# Patient Record
Sex: MALE | Race: OTHER | NOT HISPANIC OR LATINO | URBAN - METROPOLITAN AREA
[De-identification: names, ages, dates, MRNs, and addresses within clinical notes are randomized per-mention and may not be internally consistent; named-entity substitution may affect disease eponyms.]

---

## 2024-04-11 ENCOUNTER — EMERGENCY (EMERGENCY)
Facility: HOSPITAL | Age: 19
LOS: 1 days | Discharge: HOME | End: 2024-04-11
Admitting: STUDENT IN AN ORGANIZED HEALTH CARE EDUCATION/TRAINING PROGRAM
Payer: MEDICAID

## 2024-04-11 VITALS
OXYGEN SATURATION: 98 % | HEART RATE: 96 BPM | TEMPERATURE: 98 F | SYSTOLIC BLOOD PRESSURE: 117 MMHG | DIASTOLIC BLOOD PRESSURE: 73 MMHG | WEIGHT: 199.96 LBS | RESPIRATION RATE: 16 BRPM

## 2024-04-11 PROCEDURE — 0225U NFCT DS DNA&RNA 21 SARSCOV2: CPT

## 2024-04-11 PROCEDURE — 99283 EMERGENCY DEPT VISIT LOW MDM: CPT

## 2024-04-11 PROCEDURE — 99284 EMERGENCY DEPT VISIT MOD MDM: CPT

## 2024-04-11 RX ORDER — IBUPROFEN 200 MG
600 TABLET ORAL ONCE
Refills: 0 | Status: COMPLETED | OUTPATIENT
Start: 2024-04-11 | End: 2024-04-11

## 2024-04-11 RX ADMIN — Medication 600 MILLIGRAM(S): at 15:42

## 2024-04-11 NOTE — ED ADULT NURSE NOTE - CINV DISCH TEACH PARTICIP
Notification Instructions: Patient will be notified of biopsy results if positive however, patient is welcome to call office if they would like to know their biopsy results. Patient

## 2024-04-11 NOTE — ED PROVIDER NOTE - CLINICAL SUMMARY MEDICAL DECISION MAKING FREE TEXT BOX
Oral pain likely due to recently applied braces, and from oral manipulation during the application.  Has braces, mildly enlarged cryptic tonsils; otherwise the oral exam is unremarkable.  Has no throat pain or dysphagia, so do not suspect strep throat.  Eating a sandwich in ED. Consider respiratory viruses that could cause gingival pain, will do RVP.  Recommend continue ibuprofen for pain, and adding Anbesol or Oragel.

## 2024-04-11 NOTE — ED ADULT NURSE NOTE - CAS EDN DISCHARGE ASSESSMENT
PAST SURGICAL HISTORY:  No significant past surgical history
Alert and oriented to person, place and time

## 2024-04-11 NOTE — ED PROVIDER NOTE - OBJECTIVE STATEMENT
19 yo male without significant pmh c/o "chancre sores" to lower gums; reports pain on the lingual aspect of the lower molar gingiva that began 2 days ago.  He just had lower braces applied to his teeth apx 3 days before the pain began. States devices used to hold mouth open during the application of the braces was painful.  States it is painful to chew on either side, but no swelling of tongue, difficulty breathing, fever/chills, stiff neck, cough, rhinorrhea, sore throat, chest pain, or other acute complaints.   He took a dose of ibuprofen 600 mg two days ago with little relief, has not used any other analgesics.

## 2024-04-11 NOTE — ED PROVIDER NOTE - NSFOLLOWUPINSTRUCTIONS_ED_ALL_ED_FT
Soft/pureed foods - soups, smoothies, ice cream, etc.  Ibuprofen 600 mg every 6-8 hours (set a timer on your phone to remind you).   Anbesol or Oragel (you can get the generic formulations) at a pharmacy.    We are running a viral swab to test for viruses that could cause discomfort in your mouth.      Please follow up with your dentist/orthodontist in 1-3 days.    Return to the Emergency Department if you have any new or worsening symptoms, or if you have any concerns.

## 2024-04-11 NOTE — ED PROVIDER NOTE - PATIENT PORTAL LINK FT
You can access the FollowMyHealth Patient Portal offered by [facility] by registering at the following website: [email or web address]. By joining [facility]’s FollowMyHealth portal, you will also be able to view your health information using other applications (apps) compatible with our system.

## 2024-04-11 NOTE — ED PROVIDER NOTE - PHYSICAL EXAMINATION
CONSTITUTIONAL: NAD   SKIN: Normal color and turgor.    HEAD: NC/AT.  EYES: Conjunctiva clear. Anicteric sclera.  ENT: Airway clear. Normal voice. No swelling of lips, no intraoral swelling.  Bilateral large, cryptic tonsils without exudate or erythema.  Uvula midline.  Braces on upper and lower teeth.  No erythema or swelling of gingiva, no gingival lesions noted.  Mild bilateral submandibular gland swelling. No cervical CIRA.    RESPIRATORY:  Normal work of breathing. Lungs CTAB.  CARDIOVASCULAR:  RRR, S1S2. No M/R/G.      GI:  Abdomen soft, nontender.    MSK: Neck supple.  No LE edema or calf tenderness. No joint swelling or ROM limitation.  NEURO: Alert; CN: grossly intact. Speech clear.  BEAL. Gait steady.

## 2024-04-11 NOTE — ED ADULT NURSE NOTE - OBJECTIVE STATEMENT
Pt is a 19yo male presenting to ED c/o mouth sores. Pt states, "I have 2 canker sores on both sides of my lower gums. It is making it hard to eat, and the pain is radiating to my lower jaw. I have taken ibuprofen for the pain with no relief. Last time this happened I used a numbing cream that worked but this time it has not worked at all." Pt is A&Ox4, no further concerns at this time.

## 2024-04-15 DIAGNOSIS — Z20.822 CONTACT WITH AND (SUSPECTED) EXPOSURE TO COVID-19: ICD-10-CM

## 2024-04-15 DIAGNOSIS — Z98.818 OTHER DENTAL PROCEDURE STATUS: ICD-10-CM

## 2024-04-15 DIAGNOSIS — K13.79 OTHER LESIONS OF ORAL MUCOSA: ICD-10-CM

## 2024-04-15 DIAGNOSIS — J35.1 HYPERTROPHY OF TONSILS: ICD-10-CM

## 2024-06-27 ENCOUNTER — EMERGENCY (EMERGENCY)
Facility: HOSPITAL | Age: 19
LOS: 1 days | Discharge: HOME | End: 2024-06-27
Admitting: EMERGENCY MEDICINE
Payer: MEDICAID

## 2024-06-27 VITALS
DIASTOLIC BLOOD PRESSURE: 80 MMHG | SYSTOLIC BLOOD PRESSURE: 129 MMHG | WEIGHT: 214.95 LBS | OXYGEN SATURATION: 99 % | RESPIRATION RATE: 18 BRPM | HEART RATE: 82 BPM | TEMPERATURE: 98 F
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
## 2024-06-27 DIAGNOSIS — M79.671 PAIN IN RIGHT FOOT: ICD-10-CM
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
## 2024-06-27 PROCEDURE — 73630 X-RAY EXAM OF FOOT: CPT | Mod: 26,RT

## 2024-06-27 PROCEDURE — 73630 X-RAY EXAM OF FOOT: CPT

## 2024-06-27 PROCEDURE — 99284 EMERGENCY DEPT VISIT MOD MDM: CPT

## 2024-06-27 PROCEDURE — 99283 EMERGENCY DEPT VISIT LOW MDM: CPT | Mod: 25

## 2024-06-27 RX ORDER — IBUPROFEN 200 MG
600 TABLET ORAL ONCE
Refills: 0 | Status: COMPLETED | OUTPATIENT
Start: 2024-06-27 | End: 2024-06-27

## 2024-06-27 RX ADMIN — Medication 600 MILLIGRAM(S): at 12:25
